# Patient Record
Sex: MALE | Race: WHITE | NOT HISPANIC OR LATINO | Employment: UNEMPLOYED | URBAN - METROPOLITAN AREA
[De-identification: names, ages, dates, MRNs, and addresses within clinical notes are randomized per-mention and may not be internally consistent; named-entity substitution may affect disease eponyms.]

---

## 2024-04-11 ENCOUNTER — TELEPHONE (OUTPATIENT)
Dept: BEHAVIORAL HEALTH | Facility: CLINIC | Age: 34
End: 2024-04-11

## 2024-04-11 ENCOUNTER — HOSPITAL ENCOUNTER (EMERGENCY)
Facility: CLINIC | Age: 34
Discharge: HOME | End: 2024-04-17
Attending: EMERGENCY MEDICINE | Admitting: EMERGENCY MEDICINE
Payer: COMMERCIAL

## 2024-04-11 DIAGNOSIS — F30.9 MANIA (H): ICD-10-CM

## 2024-04-11 DIAGNOSIS — F25.0 SCHIZOAFFECTIVE DISORDER, BIPOLAR TYPE (H): ICD-10-CM

## 2024-04-11 LAB
ANION GAP SERPL CALCULATED.3IONS-SCNC: 16 MMOL/L (ref 7–15)
BASOPHILS # BLD AUTO: 0.1 10E3/UL (ref 0–0.2)
BASOPHILS NFR BLD AUTO: 1 %
BUN SERPL-MCNC: 17.1 MG/DL (ref 6–20)
CALCIUM SERPL-MCNC: 9.6 MG/DL (ref 8.6–10)
CHLORIDE SERPL-SCNC: 102 MMOL/L (ref 98–107)
CREAT SERPL-MCNC: 1.03 MG/DL (ref 0.67–1.17)
DEPRECATED HCO3 PLAS-SCNC: 22 MMOL/L (ref 22–29)
EGFRCR SERPLBLD CKD-EPI 2021: >90 ML/MIN/1.73M2
EOSINOPHIL # BLD AUTO: 0.1 10E3/UL (ref 0–0.7)
EOSINOPHIL NFR BLD AUTO: 1 %
ERYTHROCYTE [DISTWIDTH] IN BLOOD BY AUTOMATED COUNT: 12.6 % (ref 10–15)
GLUCOSE SERPL-MCNC: 95 MG/DL (ref 70–99)
HCT VFR BLD AUTO: 46.9 % (ref 40–53)
HGB BLD-MCNC: 15.8 G/DL (ref 13.3–17.7)
IMM GRANULOCYTES # BLD: 0 10E3/UL
IMM GRANULOCYTES NFR BLD: 0 %
LYMPHOCYTES # BLD AUTO: 2.5 10E3/UL (ref 0.8–5.3)
LYMPHOCYTES NFR BLD AUTO: 22 %
MCH RBC QN AUTO: 29.9 PG (ref 26.5–33)
MCHC RBC AUTO-ENTMCNC: 33.7 G/DL (ref 31.5–36.5)
MCV RBC AUTO: 89 FL (ref 78–100)
MONOCYTES # BLD AUTO: 1 10E3/UL (ref 0–1.3)
MONOCYTES NFR BLD AUTO: 9 %
NEUTROPHILS # BLD AUTO: 7.6 10E3/UL (ref 1.6–8.3)
NEUTROPHILS NFR BLD AUTO: 67 %
NRBC # BLD AUTO: 0 10E3/UL
NRBC BLD AUTO-RTO: 0 /100
PLATELET # BLD AUTO: 354 10E3/UL (ref 150–450)
POTASSIUM SERPL-SCNC: 4.3 MMOL/L (ref 3.4–5.3)
RBC # BLD AUTO: 5.29 10E6/UL (ref 4.4–5.9)
SODIUM SERPL-SCNC: 140 MMOL/L (ref 135–145)
TSH SERPL DL<=0.005 MIU/L-ACNC: 1.93 UIU/ML (ref 0.3–4.2)
WBC # BLD AUTO: 11.3 10E3/UL (ref 4–11)

## 2024-04-11 PROCEDURE — 85025 COMPLETE CBC W/AUTO DIFF WBC: CPT | Performed by: EMERGENCY MEDICINE

## 2024-04-11 PROCEDURE — 84443 ASSAY THYROID STIM HORMONE: CPT | Performed by: EMERGENCY MEDICINE

## 2024-04-11 PROCEDURE — 250N000013 HC RX MED GY IP 250 OP 250 PS 637: Performed by: EMERGENCY MEDICINE

## 2024-04-11 PROCEDURE — 82374 ASSAY BLOOD CARBON DIOXIDE: CPT | Performed by: EMERGENCY MEDICINE

## 2024-04-11 PROCEDURE — 36415 COLL VENOUS BLD VENIPUNCTURE: CPT | Performed by: EMERGENCY MEDICINE

## 2024-04-11 PROCEDURE — 99285 EMERGENCY DEPT VISIT HI MDM: CPT

## 2024-04-11 RX ORDER — OLANZAPINE 10 MG/1
10 TABLET, ORALLY DISINTEGRATING ORAL 2 TIMES DAILY PRN
Status: DISCONTINUED | OUTPATIENT
Start: 2024-04-11 | End: 2024-04-17 | Stop reason: HOSPADM

## 2024-04-11 RX ORDER — QUETIAPINE 400 MG/1
400 TABLET, FILM COATED, EXTENDED RELEASE ORAL AT BEDTIME
Status: DISCONTINUED | OUTPATIENT
Start: 2024-04-11 | End: 2024-04-15

## 2024-04-11 RX ADMIN — QUETIAPINE FUMARATE 400 MG: 400 TABLET, EXTENDED RELEASE ORAL at 21:55

## 2024-04-11 RX ADMIN — OLANZAPINE 10 MG: 10 TABLET, ORALLY DISINTEGRATING ORAL at 14:54

## 2024-04-11 ASSESSMENT — ACTIVITIES OF DAILY LIVING (ADL)
ADLS_ACUITY_SCORE: 35

## 2024-04-11 ASSESSMENT — COLUMBIA-SUICIDE SEVERITY RATING SCALE - C-SSRS
TOTAL  NUMBER OF ABORTED OR SELF INTERRUPTED ATTEMPTS LIFETIME: NO
6. HAVE YOU EVER DONE ANYTHING, STARTED TO DO ANYTHING, OR PREPARED TO DO ANYTHING TO END YOUR LIFE?: NO
1. HAVE YOU WISHED YOU WERE DEAD OR WISHED YOU COULD GO TO SLEEP AND NOT WAKE UP?: NO
2. HAVE YOU ACTUALLY HAD ANY THOUGHTS OF KILLING YOURSELF?: NO
TOTAL  NUMBER OF INTERRUPTED ATTEMPTS LIFETIME: NO
ATTEMPT LIFETIME: NO

## 2024-04-11 NOTE — ED NOTES
Pt observed to be masturbating on camera. Pt told by security to stop this behavior. Pt stopped this action but still has penis out.
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
## 2024-04-11 NOTE — ED NOTES
Patient not cooperating with staff after we ask him to lay on the bed instead of kneel as that it is a safety hazard, patient was then asked again to stand up so that we can get the bed out and patient then rolled off the bed on the ground. Bed frame was taken out. Patient continues to lay on the ground. Patient was then found to be taking his pants down and smelling his fingers.

## 2024-04-11 NOTE — PROGRESS NOTES
IP MH Referral Acuity Rating Score (RARS)    LMHP complete at referral to IP MH, with DEC; and, daily while awaiting IP MH placement. Call score to PPS.  CRITERIA SCORING   New 72 HH and Involuntary for IP MH (not adolescent) 1/1   Boarding over 24 hours 0/1   Vulnerable adult at least 55+ with multiple co morbidities; or, Patient age 11 or under 0/1   Suicide ideation without relief of precipitating factors 0/1   Current plan for suicide 0/1   Current plan for homicide 0/1   Imminent risk or actual attempt to seriously harm another without relief of factors precipitating the attempt 0/1   Severe dysfunction in daily living (ex: complete neglect for self care, extreme disruption in vegetative function, extreme deterioration in social interactions) 1/1   Recent (last 2 weeks) or current physical aggression in the ED 1/1   Restraints or seclusion episode in ED 0/1   Verbal aggression, agitation, yelling, etc., while in the ED 0/1   Active psychosis with psychomotor agitation or catatonia 0/1   Need for constant or near constant redirection (from leaving, from others, etc).  1/1   Intrusive or disruptive behaviors 1/1   TOTAL Acuity Total Score: 5

## 2024-04-11 NOTE — ED NOTES
Pt observed to be sticking fingers in his anus and sniffing them. Pt reminded that he is on camera and was asked not to do this again.
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
## 2024-04-11 NOTE — ED NOTES
Pt is sleeping on the floor in his room with blanket around him . Letting pt sleep as he had been fairly disruptive before and Pt was given meds to help him calm down .

## 2024-04-11 NOTE — ED NOTES
Bed: [facility]  Expected date: 4/11/24  Expected time: 11:36 AM  Means of arrival:   Comments:  Hems 448 - 33M Psych Eval, Restrained, Hold eta 1333

## 2024-04-11 NOTE — ED NOTES
Patient was asked to confirm his birthday and patient noted that the birthday was incorrect, he stated that the correct birthday is [birth year].

## 2024-04-12 ENCOUNTER — DOCUMENTATION ONLY (OUTPATIENT)
Dept: OTHER | Facility: CLINIC | Age: 34
End: 2024-04-12
Payer: COMMERCIAL

## 2024-04-12 ENCOUNTER — TELEPHONE (OUTPATIENT)
Dept: BEHAVIORAL HEALTH | Facility: CLINIC | Age: 34
End: 2024-04-12
Payer: COMMERCIAL

## 2024-04-12 LAB
AMPHETAMINES UR QL SCN: NORMAL
BARBITURATES UR QL SCN: NORMAL
BENZODIAZ UR QL SCN: NORMAL
BZE UR QL SCN: NORMAL
CANNABINOIDS UR QL SCN: NORMAL
FENTANYL UR QL: NORMAL
OPIATES UR QL SCN: NORMAL
PCP QUAL URINE (ROCHE): NORMAL

## 2024-04-12 PROCEDURE — 99204 OFFICE O/P NEW MOD 45 MIN: CPT | Performed by: PSYCHIATRY & NEUROLOGY

## 2024-04-12 PROCEDURE — 80307 DRUG TEST PRSMV CHEM ANLYZR: CPT | Performed by: EMERGENCY MEDICINE

## 2024-04-12 PROCEDURE — 250N000013 HC RX MED GY IP 250 OP 250 PS 637: Performed by: EMERGENCY MEDICINE

## 2024-04-12 RX ORDER — QUETIAPINE 400 MG/1
400 TABLET, FILM COATED, EXTENDED RELEASE ORAL AT BEDTIME
COMMUNITY
End: 2024-04-17

## 2024-04-12 RX ADMIN — FLUOXETINE HYDROCHLORIDE 30 MG: 20 CAPSULE ORAL at 08:02

## 2024-04-12 RX ADMIN — OLANZAPINE 10 MG: 10 TABLET, ORALLY DISINTEGRATING ORAL at 08:02

## 2024-04-12 RX ADMIN — QUETIAPINE FUMARATE 400 MG: 400 TABLET, EXTENDED RELEASE ORAL at 22:13
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
## 2024-04-12 NOTE — TELEPHONE ENCOUNTER
[place]: [place]  Access Inpatient Bed Call Log  4/12/24 @ 1:00am   Intake has called facilities that have not updated their bed status within the last 12 hours.??     *METRO:  [place] -- [facility]: @ cap per website.  [facility]:  @ cap per website.  [place] -- [facility]: @ cap per website.  [place] -- [facility]: @ cap per website. Low acuity only.  [place] -- [facility]: @ cap per website.  [facility] -- [facility]: @ cap per website.   [facility] - @ cap per website. Ages 18-28, Voluntary only, NO aggression/physical/sexual assault, violence hx or drug abuse, or psychosis. Negative Covid.   [place] -- [facility]: @ cap per website.  [place] -- [facility]: @ cap per website.  [place] -- [facility]: POSTING 1 BED. Not reviewing until 10AM.     Pt remains on waitlist pending appropriate placement availability

## 2024-04-12 NOTE — PHARMACY-ADMISSION MEDICATION HISTORY
Pharmacist Admission Medication History    Admission medication history is complete. The information provided in this note is only as accurate as the sources available at the time of the update.    Information Source(s): Patient's pharmacy via phone    Changes made to PTA medication list:  Added: all  Deleted: None  Changed: None    Allergies reviewed with patient and updates made in EHR: unable to assess    Medication History Completed By: Kristin Uriarte RPH 4/12/2024 9:41 AM    PTA Med List   Medication Sig Last Dose    FLUoxetine (PROZAC) 20 MG capsule Take 20 mg by mouth daily     QUEtiapine ER (SEROQUEL XR) 400 MG 24 hr tablet Take 400 mg by mouth at bedtime

## 2024-04-12 NOTE — TELEPHONE ENCOUNTER
R: STATEWIDE, BUT PT'S PARENTS PREFER [facility] Access Inpatient Bed Call Log 4/12/24 9:15 AM    Intake has called facilities that have not updated the bed status within the last 12 hours.                                           [facility] is at capacity                       [facility] is posting 0 beds. [phone] Per call @8:15am              [facility] is posting 1 bed. Negative covid required                         [facility] is posting 0 beds. Neg covid. No high school/Livia-psych. [phone] Per call @9:14am              [facility] is [facility] 2 beds. [phone]              [facility] is posting 0 beds. [phone]              [facility] is posting 0 beds. Negative covid. [phone] Per call @8:45am, currently reviewing for last bed.              [facility] is [facility] 2 beds [phone]                   [facility] is posting 1 bed. LOW acuity ONLY. Mixed unit 12+. Negative covid- [phone]              [facility] has 2 beds posted. No aggression. Negative Covid. Low acuity              [facility] is posting 0 beds. Negative covid. [phone]              [facility] is posting 2 beds. Low acuity only. Neg covid.  [phone]               [facility] [place] is posting 4 beds. Low acuity. No current aggression.               [facility] is posting 0 beds available. Negative covid.  [phone].                  [facility] is posting 0 beds. Low acuity. 72 HH hold preferred. Negative covid required. [phone] Per call @8:55am              [facility] ([place]) is posting 1 bed. Low acuity only. Neg covid.  [phone]              [facility] [place] is posting 3 beds.  Negative covid required.   Vol only, No history of aggression, violence, or assault. No sexual offenders. No 72 HH holds. [phone]                    [facility] is posting 3 beds. Negative covid required.  (Must have the cognitive ability to do programming. No aggressive or violent behavior or recent HX in the last 2 yrs. MH must be primary.) Always low acuity. [phone]              [facility] has 0 beds posted. Negative covid required.  Low acuity only. Violence and aggression capped.  [phone]              [facility] is [facility] 0 beds. Low acuity, Negative covid required. [phone] Per call @8:51am, 5 currently on their list.              [facility] posting 2 beds Negative covid required.  [phone]               [facility] is posting 0 beds. Negative covid. LOW acuity. (No lines, drains, or tubes, oxygen, CPAP, IV, etc.) Must Have a Ride Home. [phone]              [facility] is posting 5 beds. Negative covid. (No. lines, drains, or tubes, oxygen, CPAP, IV, etc.). [phone]              [facility] is posting 10 beds. No covid test required. [phone] Per call @8:43am OOS      Pt remains on the work list pending appropriate bed availability.     10:20a Called Denis Dub[facility] to inquire about availability. [facility] informed at capacity.     10:26a Called [facility] to inquire about bed availability. [facility] informed will CB if there are beds available.     1:30p Paged Psychiatry Provider Royce, awaiting response.    2:27p Re-Paged Psychiatry Provider Royce, awaiting response.    3:07p Received call from Psychiatry Provider Royce for review of pt. Provider informed that pt is too acute to admit to Unit 20 and recommends unit 12 for admission d/t pt being manic and sexual intrusiveness.

## 2024-04-12 NOTE — ED NOTES
"At 1700, pt yelled out, \"Is anyone there!?\" After waking up from nap. RN went into pt's room and pt hit wall, stating he was frustrated about not being able to discharge home.    RN provided reassurance, explained plan of care, and offered orange juice. Pt thanked nurse. Pt is calm and redirectable at this time.    Pt is now talking with DEC .   "

## 2024-04-12 NOTE — ED NOTES
Pt refuses vital sign assessment. Does not like the sound of the machine. Ate breakfast and ceased yelling. Pt was provided with a board game at his request.

## 2024-04-12 NOTE — ED NOTES
Writer spoke with Pts mother, she endorsed that she would be emailing guardianship paperwork to writer.        HANNA Ugarte on 4/12/2024 at 7:52 AM    Writer received guardianship paperwork and writer forwarded this to honoring HANNA Zamudio on 4/12/2024 at 9:14 AM

## 2024-04-12 NOTE — ED NOTES
Pt yelling for someone to update him on his status. Pt then walking hallway and opening doors to other pt rooms. Pt redirectable to stop yelling and go back to his room. Writer offered board games, juice/food, toiletries, and change of clothes. Pt asking writer how he can get out of here. Writer encouraged pt to maintain appropriate behavior, take medications, and take responsibility for his self care and actions. Pt understanding and willing to take PRN zyprexa for yelling.     Pt requested coloring materials over a board game, then began yelling again in his room. When writer re-entered room, pt states he wants a board game instead. Writer informed pt that he will need to remain quiet x 30 min before he will be allowed a board game. Other pts are sleeping and he is creating an unsafe environment for them. Pt nodded in understanding.

## 2024-04-12 NOTE — ED NOTES
Pt slept through most of shift.  Intermittent, indiscernible yelling at times.  Pt requested orange juice and was given orange juice at bedside.

## 2024-04-12 NOTE — ED NOTES
Pt awake this morning and relates that the reason he is here is because he texted left wing/right wing friends that he went to school with to tell them he wants to be president.  Pt also states he has a good support system.

## 2024-04-12 NOTE — TELEPHONE ENCOUNTER
[place]: [place]  [place] Inpatient Bed Call Log 4/12/24 4:15 PM    Intake has called facilities that have not updated the bed status within the last 12 hours.                                       [facility] is at capacity                     [facility] is [facility] 0 beds. [phone] 4:15 PM Per Awa 0 beds available            [facility] is posting 2 beds. Negative covid required                       [facility] is posting 0 beds. Neg covid. No high school/Livia-psych. [phone] Per call @9:14am            [facility] is posting 2 beds. [phone]            [facility] is posting 0 beds. [phone]            [facility] is posting 0 beds. Negative covid. [phone] Per call @8:45am, currently reviewing for last bed.            [facility] is posting 2 beds [phone]               [facility] is posting 3 beds. LOW acuity ONLY. Mixed unit 12+. Negative covid- [phone]            [facility] has 2 beds posted. No aggression. Negative Covid. Low acuity            [facility] is posting 0 beds. Negative covid. [phone]            [facility] is posting 0 beds. Low acuity only. Neg covid.  [phone]             [facility] is posting 4 beds. Low acuity. No current aggression.             [facility] ([place] is posting 0 beds available. Negative covid.  [phone].                [facility] is posting 0 beds. Low acuity. 72 HH hold preferred. Negative covid required. [phone] Per call @8:55am            [facility] ([place]) is posting 4 beds. Low acuity only. Neg covid.  [phone]            [facility] [place] is posting 6 beds.  Negative covid required.   Vol only, No history of aggression, violence, or assault. No sexual offenders. No 72 HH holds. [phone]               [facility] is posting 3 beds. Negative covid  required.  (Must have the cognitive ability to do programming. No aggressive or violent behavior or recent HX in the last 2 yrs.  must be primary.) Always low acuity. [phone]            [facility] has 0 beds posted. Negative covid required.  Low acuity only. Violence and aggression capped.  [phone]            [facility] is posting 0 beds. Low acuity, Negative covid required. [phone] Per Teressa @4:22pm, currently have a fluctuating waitlist.            [facility] posting 5 beds Negative covid required.  [phone]             [facility] [place] is posting 0 beds. Negative covid. LOW acuity. (No lines, drains, or tubes, oxygen, CPAP, IV, etc.) Must Have a Ride Home. [phone]            [facility] is posting 5 beds. Negative covid. (No. lines, drains, or tubes, oxygen, CPAP, IV, etc.). [phone]            [facility] is posting 10 beds. No covid test required. [phone] Per call @8:43am     Pt remains on the work list pending appropriate bed availability.
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
## 2024-04-12 NOTE — ED NOTES
Pt literally just woke up and he was given his dinner .  Pt apparently does not recall having his DEC assessment . Pt wanted to know when he would have the assessment .

## 2024-04-13 ENCOUNTER — TELEPHONE (OUTPATIENT)
Dept: BEHAVIORAL HEALTH | Facility: CLINIC | Age: 34
End: 2024-04-13
Payer: COMMERCIAL

## 2024-04-13 PROCEDURE — 250N000013 HC RX MED GY IP 250 OP 250 PS 637: Performed by: EMERGENCY MEDICINE

## 2024-04-13 RX ADMIN — QUETIAPINE FUMARATE 400 MG: 400 TABLET, EXTENDED RELEASE ORAL at 22:00

## 2024-04-13 NOTE — TELEPHONE ENCOUNTER
[place]: [place]  [place] Inpatient Bed Call Log  4/13/24 @ 1:00am   Intake has called facilities that have not updated their bed status within the last 12 hours.??     *METRO:  [place] -- [facility]: @ cap per website.  [facility]:  @ cap per website.  [place] -- [facility]: POSTING 1 BED. Low acuity  [place] -- [facility]: @ cap per website. Low acuity only.  [place] -- [facility]: POSTING 1 BED.  [facility]: @ cap per website.   [facility]/ beds - @ cap per website. Ages 18-28, Voluntary only, NO aggression/physical/sexual assault, violence hx or drug abuse, or psychosis. Negative Covid.   [facility]: @ cap per website.  [place] -- [facility]: @ cap per website.  [place] -- [facility]: POSTING 2 BEDS. Not reviewing until 10AM.    *STATEWIDE (by distance):  [facility] - POSTING 4 BEDS. Mixed unit/Low acuity only.   [facility] - POSTING 2 BEDS. Low acuity, No aggression  [facility] -- @ cap per website.   [facility] - POSTING 4 BEDS. Low acuity only. No current aggression.  [facility] - POSTING 1 BED. Negative Covid. Lower acuity only.  [facility] - POSTING 2 BEDS. Low acuity only. Prefer med-adjustment placements.  [facility] - POSTING 3 BEDS.  No aggression.  [place] - [facility]: @ cap per website. No aggressive behaviors.   [place] -- : POSTING 5 BEDS.  No hx of aggression. No sexual offenders. Voluntary patients only.  [place] -- [facility]: POSTING 3 BEDS. Low acuity only. Must be able to do programming. No aggression/violent behavior in 2 years. No CD treatment.  [place] -- [place]: @ cap per website. Negative Covid test. Must be low acuity ONLY.  [place] -- [facility]: @ cap per website. Low acuity. Negative Covid.   [place] -- [facility]: POSTING 5 BEDS. Low Acuity only. Negative Covid.  [facility]: @ cap per website. No hx of aggression/assault. No lines, drains or tubes. Does not provide detox or CD treatment. Require a confirmed ride upon discharge.  [place] -- [facility]: POSTING 5 BEDS. Negative COVID. No medical devices.     Pt remains on waitlist pending appropriate placement availability

## 2024-04-13 NOTE — TELEPHONE ENCOUNTER
[place]: [place]  Access Inpatient Bed Call Log 4/13/24 8:30 AM    Intake has called facilities that have not updated the bed status within the last 12 hours.                             [facility] is at capacity           [facility] is posting 0 beds. [phone] Per call @8:29am, at capacity and can try back tomorrow  [facility] is posting 1 bed. Negative covid required             [facility] is posting 0 beds. Neg covid. No high school/Livia-psych. [phone] Per Cesia at 7:46am, they are at capacity for the weekend.  [facility] is posting 2 beds. [phone]  [facility] is posting 0 beds. [phone]   [facility] is posting 0 beds. Negative covid. [phone] Per call @8:10am  [facility] is posting 2 beds [phone]    [facility] is posting 4 beds. LOW acuity ONLY. Mixed unit 12+. Negative covid- [phone]  [facility] has 0 beds posted. No aggression. Negative Covid. Low acuity   [facility] is posting 0 beds. Negative covid. [phone] Per call @8:26, voicemail states they are at capacity  [facility] ([place] is posting 1 bed. Low acuity only. Neg covid.  [phone]   [facility] is posting 4 beds. Low acuity. No current aggression.    [facility] is posting 1 bed available. Negative covid.  [phone].      [facility] is posting 0 beds. Low acuity. 72 HH hold preferred. Negative covid required. [phone] Per call @8:23am, at capacity  [facility] is posting 3 beds. Low acuity only. Neg covid.  [phone]   [facility] [place] is posting 5 beds.  Negative covid required.   Vol only, No history of aggression, violence, or assault. No sexual offenders. No 72 HH holds. [phone]     [facility] is posting 3 beds. Negative covid required.  (Must have the cognitive ability to do programming. No aggressive or violent  behavior or recent HX in the last 2 yrs. MH must be primary.) Always low acuity. [phone]   [facility] has 0 beds posted. Negative covid required.  Low acuity only. Violence and aggression capped.  [phone]   [facility] is posting 1 bed. Low acuity, Negative covid required. [phone] Per call @8:19am, Potentially can review for 1M or 2 F depending on appropriateness  [place] [place] posting 0 beds Negative covid required.  [phone]   [facility] is posting 0 beds. Negative covid. LOW acuity. (No lines, drains, or tubes, oxygen, CPAP, IV, etc.) Must Have a Ride Home. [phone] Per call @8:17am, no answer  [facility] is posting 5 beds. Negative covid. (No. lines, drains, or tubes, oxygen, CPAP, IV, etc.). [phone]     Pt remains on the work list pending appropriate bed availability.

## 2024-04-13 NOTE — ED PROVIDER NOTES
Signout taken from Dr. Gutiérrez.  Patient came in with acute psychosis and hypersexual behaviors.  Patient is currently on a 72-hour hold and awaiting an inpatient mental health bed.     Regulo Cloud MD  04/13/24 5978

## 2024-04-14 ENCOUNTER — TELEPHONE (OUTPATIENT)
Dept: BEHAVIORAL HEALTH | Facility: CLINIC | Age: 34
End: 2024-04-14
Payer: COMMERCIAL

## 2024-04-14 PROCEDURE — 250N000013 HC RX MED GY IP 250 OP 250 PS 637: Performed by: EMERGENCY MEDICINE

## 2024-04-14 RX ADMIN — QUETIAPINE FUMARATE 400 MG: 400 TABLET, EXTENDED RELEASE ORAL at 21:26

## 2024-04-14 NOTE — PROGRESS NOTES
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 There are other situations that he is not feeling quite sure about. He believes a previous friend/crush was in disguise at his work and he is not quite sure what is reality. 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 He is also unsure if the spiritual connection and inner dialogue he has been having with another girl and her friend is real or delusional. He was open to consider that this was delusional per [facility]'s suggestion. Pt is confused about why he is having an episode when he has been keeping up on his medications. He identified several stressors, including signing a one year lease on his apartment, having coffee and coming out as bisexual. He reported that he has been told by the psychiatric provider that his selective serotonin reuptake inhibitor could have caused this episode. Pt continues to be agreeable to IP MH, but states that he would prefer to just stay in this room as he doesn't like attending groups etc in the hospital.    Therapeutic Intervention(s) Provided: Engaged in safety planning, Worked on relapse prevention planning (review of stressors, early warning signs, written plan to respond to signs, and rehearse plan)., Explored and identified early warning signs to psychosis. Explored motivation for IP MH., Explored barriers to discharge.    Current Symptoms:   " impaired decision making, (delusions)      Mental Status Exam   Affect: Appropriate  Appearance: Appropriate  Attention Span/Concentration: Attentive  Eye Contact: Variable    Fund of Knowledge: Appropriate   Language /Speech Content: Fluent  Language /Speech Volume: Normal  Language /Speech Rate/Productions: Normal  Recent Memory: Intact  Remote Memory: Intact  Mood: Normal  Orientation to Person: Yes   Orientation to Place: Yes  Orientation to Time of Day: Yes  Orientation to Date: Yes     Situation (Do they understand why they are here?): Yes  Psychomotor Behavior: Normal  Thought Content: Delusions  Thought Form: Intact    Treatment Objective(s) Addressed: rapport building, orienting the patient to therapy, identifying and practicing coping strategies, identifying an appropriate aftercare plan, processing feelings, safety planning, assessing safety, identifying treatment goals    Patient Response to Interventions: eager to participate    Progress Towards Goals: Patient Reports Symptoms Are: improving  Patient Progress Toward Goals: is making progress  Comment: Pt's delusional thinking is decreasing and his insight in improving.  Next Step to Work Toward Discharge: symptom stabilization  Symptom Stabilization Comment: Pt is awaiting IP MH placement.    Case Management: Summary of Interaction: None today.    Plan: inpatient mental health  yes provider Dr. Cloud  yes    Clinical Substantiation: Pt continues to report delusional thinking, but his symptoms of psychosis appear to be decreasing and his insight increasing. His mood appears dysthymic and he did not engage in odd behaviors during assessment. Pt will benefit from inpatient hospitalization to further stabilize current acute symptoms of psychosis.    Legal Status: Legal Status at Admission: 72 Hour Hold  72 Hour Hold - Date/Time Initiated: 4/11/24 @1554  72 Hour Hold - Date/Time Ends: 4/16/24 @ 1554    Session Status: Time session started: 2128  Time  session ended: 2148  Session Duration (minutes): 20 minutes  Session Number: 2  Anticipated number of sessions or this episode of care: 4    Time Spent: 20 minutes    CPT Code: CPT Codes: 05351 - Psychotherapy (with patient) - 30 (16-37*) min    Diagnosis:   Patient Active Problem List   Diagnosis    Schizoaffective disorder, bipolar type (H)       Primary Problem This Admission: Active Hospital Problems    Schizoaffective disorder, bipolar type (H)        Chantal Sultana   Licensed Mental Health Professional (LMHP), [facility]  [phone]

## 2024-04-14 NOTE — TELEPHONE ENCOUNTER
R: [place]  Access Inpatient Bed Call Log  4/14/24 @ 1:00am   Intake has called facilities that have not updated their bed status within the last 12 hours.??     *METRO:  [place] -- [facility]: @ cap per website.  [facility]:  @ cap per website.  [place] -- [facility]: @ cap per website. Low acuity  [place] -- [facility]: @ cap per website. Low acuity only.  [place] -- [facility]: @ cap per website.  [facility]: @ cap per website.   [facility]/ beds - @ cap per website. Ages 18-28, Voluntary only, NO aggression/physical/sexual assault, violence hx or drug abuse, or psychosis. Negative Covid.   [place] [facility]: @ cap per website.  [place] -- [facility]: @ cap per website.  [place] -- [facility]: POSTING 2 BEDS. Not reviewing until 10AM.    *STATEWIDE (by distance):  [facility] - POSTING 4 BEDS. Mixed unit/Low acuity only.   [facility] - @ cap per website. Low acuity, No aggression  [facility] -- @ cap per website.   [facility] - POSTING 4 BEDS. Low acuity only. No current aggression.  [facility] - POSTING 1 BED. Negative Covid. Lower acuity only.  [facility] - @ cap per website. Low acuity only. Prefer med-adjustment placements.  [facility] - POSTING 3 BEDS.  No aggression.  [place] - [facility]: @ cap per website. No aggressive behaviors.   [place] -- [facility]: POSTING 5 BEDS.  No hx of aggression. No sexual offenders. Voluntary patients only.  [place] -- [facility]: POSTING 2 BEDS. Low acuity only. Must be able to do programming. No aggression/violent behavior in 2 years. No CD treatment.  [place] [facility]: @ cap per website. Negative Covid test. Must be low acuity ONLY.  [place] -- [facility]: @ cap per website. Low acuity. Negative Covid.   [place] -- [facility]: @ cap per website. Low Acuity only. Negative Covid.  [facility]  [facility]: @ [facility] per website. No hx of aggression/assault. No lines, drains or tubes. Does not provide detox or CD treatment. Require a confirmed ride upon discharge.  [place] -- [facility]: POSTING 5 BEDS. Negative COVID. No medical devices.     Pt remains on waitlist pending appropriate placement availability

## 2024-04-14 NOTE — ED PROVIDER NOTES
Received sign out.  DEC did re-assessment and notes improvement, still having some psychosis but much improved.  Psychiatry consult will be placed and possibly might go home tomorrow if continues to improve and approved by psychiatry.  Also, patient is guardian of parents who approve of care, does not require 72 hold.     Song Gutiérrez MD  04/14/24 155       Song Gutiérrez MD  04/14/24 1553

## 2024-04-14 NOTE — TELEPHONE ENCOUNTER
R: [place]  Access Inpatient Bed Call Log 4/14/24 3:05 PM    Intake has called facilities that have not updated the bed status within the last 12 hours.                                       [facility] is at capacity                     [facility] is posting 0 beds. [phone] Per call at 3:11 PM, per [place] they are at capacity            [facility] is posting 0 beds. Negative covid required                       [facility] is posting 0 beds. Neg covid. No high school/Livia-psych. [phone] Per Cesia yesterday, they are at capacity for the weekend.            [facility] is posting 0 beds. [phone]            [facility] is posting 0 beds. [phone]            [facility] is posting 0 beds. Negative covid. [phone] Per call @8:05am            [facility] is posting 2 beds [phone]               [facility] is posting 4 beds. LOW acuity ONLY. Mixed unit 12+. Negative covid- [phone]            [facility] has 0 beds posted. No aggression. Negative Covid. Low acuity            [facility] is posting 0 beds. Negative covid. [phone] Per vm @8:21, they are at cap            [facility] is posting 1 bed. Low acuity only. Neg covid.  [phone]             [facility] is posting 4 beds. Low acuity. No current aggression.             [facility] ([place]) is posting 0 beds available. Negative covid.  [phone].                [facility] [place] is posting 0 beds. Low acuity. 72 HH hold preferred. Negative covid required. [phone] Per call @8:19am            [facility] ([place] is posting 3 beds. Low acuity only. Neg covid.  [phone]            [facility] [place] is posting 3 beds.  Negative covid required.   Vol only, No history of aggression, violence, or assault. No sexual offenders. No 72 HH holds. [phone] 3:13 PM Per [place] they have  3 open adult beds, must be voluntary.               [facility] is posting 2 beds. Negative covid required.  (Must have the cognitive ability to do programming. No aggressive or violent behavior or recent HX in the last 2 yrs. MH must be primary.) Always low acuity. [phone] Per call @8:18am            [facility] has 0 beds posted. Negative covid required.  Low acuity only. Violence and aggression capped.  [phone]            [facility] is posting 0 beds. Low acuity, Negative covid required. [phone] Per call @8:15, at cap w/ wait list            [facility] posting 0 beds Negative covid required.  [phone]             [facility] [place] is posting 0 beds. Negative covid. LOW acuity. (No lines, drains, or tubes, oxygen, CPAP, IV, etc.) Must Have a Ride Home. [phone] Per call @3:17 Per [place] they are currently at capacity.            [facility] is posting 5 beds. Negative covid. (No. lines, drains, or tubes, oxygen, CPAP, IV, etc.). [phone] Per call @8:10am beds available    Pt remains on the work list pending appropriate bed availability.     6:57 PM Intake called [facility]. Per Maggie, there are no high acuity beds available to review this pt for.

## 2024-04-14 NOTE — TELEPHONE ENCOUNTER
R: [place]  Access Inpatient Bed Call Log 4/14/24 8:30 AM    Intake has called facilities that have not updated the bed status within the last 12 hours.                             [facility] is at capacity           [facility] is posting 0 beds. [phone] Per call at 8:25am, not taking outside referrals. Can try after bed meeting  [facility] is posting 1 bed. Negative covid required             [facility] is posting 0 beds. Neg covid. No high school/Livia-psych. [phone] Per Cesia yesterday, they are at capacity for the weekend.  [facility] is posting 2 beds. [phone]  [facility] is posting 0 beds. [phone]   [facility] is posting 0 beds. Negative covid. [phone] Per call @8:05am  [facility] is posting 2 beds [phone]    [facility] is posting 4 beds. LOW acuity ONLY. Mixed unit 12+. Negative covid- [phone]  [facility] has 0 beds posted. No aggression. Negative Covid. Low acuity   [facility] is posting 0 beds. Negative covid. [phone] Per vm @8:21, they are at cap  [facility] ([place] is posting 1 bed. Low acuity only. Neg covid.  [phone]   [facility] is posting 4 beds. Low acuity. No current aggression.    [facility] ([place] is posting 1 bed available. Negative covid.  [phone].      [facility] is posting 0 beds. Low acuity. 72 HH hold preferred. Negative covid required. [phone] Per call @8:19am  [facility] ([place] is posting 3 beds. Low acuity only. Neg covid.  [phone]   [facility] [place] is posting 5 beds.  Negative covid required.   Vol only, No history of aggression, violence, or assault. No sexual offenders. No 72 HH holds. [phone]     [facility] is posting 2 beds. Negative covid required.  (Must have the cognitive ability to do programming. No aggressive or violent behavior or recent  HX in the last 2 yrs. MH must be primary.) Always low acuity. [phone] Per call @8:18am   has 0 beds posted. Negative covid required.  Low acuity only. Violence and aggression capped.  [phone]   [facility] is posting 0 beds. Low acuity, Negative covid required. [phone] Per call @15, at cap w/ wait list  [facility] posting 1 bed Negative covid required.  [phone]   [facility] is posting 1 bed. Negative covid. LOW acuity. (No lines, drains, or tubes, oxygen, CPAP, IV, etc.) Must Have a Ride Home. [phone] Per call @8:14am  [facility] is posting 5 beds. Negative covid. (No. lines, drains, or tubes, oxygen, CPAP, IV, etc.). [phone] Per call @8:10am beds available    Pt remains on the work list pending appropriate bed availability.

## 2024-04-14 NOTE — PROGRESS NOTES
"Triage & Transition Services, Extended Care     Therapy Progress Note    Patient: Cachorro goes by \"Cachorro,\" uses he/him pronouns  Date of Service: April 14, 2024  Site of Service: [facility] EMERGENCY DEPT                             BH2  Patient was seen yes  Mode of Assessment: In person    Presentation Summary: Pt reported that he feels he is clear minded today and is feeling bored. Pt continues to display insight re delusions he had a few days ago, but also continues to endorse some delusions such as an old friend showing in disguise at work, or a girl playing \"games with me\", suggesting that she wanted him to look in her phone and purse while \"she pretended to sleep\". Pt is open to consider that these may also be delusions. Pt's mood is euthymic in session and is agreeable to remain in the hospital. He states preference for staying in the ED rather than IP as he doesn't like to do groups in the hospital. Pt reported optimism that he has been open and forthcoming about some issues in his life and that he he is excited for the new him and new opportunities. He identified goals of dating, working part time and spending time with friends. We explored current outpatient providers and pt voiced understanding regarding the need for regula and ongoing appointments.    Therapeutic Intervention(s) Provided: Engaged in safety planning, Explored motivation for IP MH. Explored barriers to discharge.    Current Symptoms:   impaired decision making, irritable    (delusions)      Mental Status Exam   Affect: Appropriate  Appearance: Appropriate  Attention Span/Concentration: Attentive  Eye Contact: Engaged    Fund of Knowledge: Appropriate   Language /Speech Content: Fluent  Language /Speech Volume: Normal  Language /Speech Rate/Productions: Normal  Recent Memory: Intact  Remote Memory: Intact  Mood: Normal  Orientation to Person: Yes   Orientation to Place: Yes  Orientation to Time of Day: Yes  Orientation to " Date: Yes     Situation (Do they understand why they are here?): Yes  Psychomotor Behavior: Normal  Thought Content: Delusions  Thought Form: Intact    Treatment Objective(s) Addressed: identifying and practicing coping strategies, processing feelings, safety planning, identifying an appropriate aftercare plan, assessing safety, identifying treatment goals, identifying additional supports    Patient Response to Interventions: eager to participate    Progress Towards Goals: Patient Reports Symptoms Are: improving  Patient Progress Toward Goals: is making progress  Comment: Pt's delusional thinking is decreasing and his insight in improving.  Next Step to Work Toward Discharge: symptom stabilization  Symptom Stabilization Comment: Pt is awaiting IP MH placement. Pt will have a psych consult tomorrow where possible d/c can be explored due to curet level of stablization.    Case Management: Case Management Included: collaborating with patient's support system  Details on Collaborating with Patient's Support System: [facility] spoke with pt's parents and legal guardians. HP updated them on pt's progress. Pt's mood has stabilized, his delusion are decreased but still present.  Summary of Interaction: The family is in support of whatever is recommended. They are in support of current IP and indicated that they are also in support of discharge if this will be recommended, they do want to be consulted first.    Plan: inpatient mental health  yes provider Dr. Gutiérrez  yes    Clinical Substantiation: Pt's mood has stabilized and his psychosis has cleared significantly. Pt continues to present with delusional thinking. Pt should be assessed during psych tomorrow to determine whether the IP MH recommendation is still appropriate or if pt may be appropriate for discharge.    Legal Status: Legal Status at Admission:  (Pt is on a 72 hour. Pt does have legal guardians that have supported IP and a 72 hour is no longer needed for this  reason. This has been brought to the attention of the attending. Honoring choices have validated edwardohip.)  72 Hour Hold - Date/Time Initiated: 4/11/24 @1554  72 Hour Hold - Date/Time Ends: 4/16/24 @ 1554    Session Status: Time session started: 1404  Time session ended: 1429  Session Duration (minutes): 25 minutes  Session Number: 3  Anticipated number of sessions or this episode of care: 4    Time Spent: 25 minutes    CPT Code: CPT Codes: 86806 - Psychotherapy (with patient) - 30 (16-37*) min    Diagnosis:   Patient Active Problem List   Diagnosis    Schizoaffective disorder, bipolar type (H)       Primary Problem This Admission: Active Hospital Problems    Schizoaffective disorder, bipolar type (H)        Chantal Sultana   Licensed Mental Health Professional (LMHP), [facility]  [phone]

## 2024-04-14 NOTE — ED NOTES
" Pt  did not engage in odd behaviors this shift. He appropriately took his  scheduled night time medications and as asked appropriate questions such as\" What dose of the Seroquel are you giving me and what time  do we get breakfast\"? Pt informed Seroquel 400 mg and that breakfast  arrived around 08:00. Pt replied \" Thank you\".       "

## 2024-04-14 NOTE — ED PROVIDER NOTES
ED course:  Patient received as a handoff from my partner Dr. Mendez.  No issues during my care.  Remains on 72-hour hold.    Impression:    ICD-10-CM    1. Psychosis, unspecified psychosis type (H)  F29       2. Delusions (H)  F22       3. Hypersexuality state  F52.8       4. Alicia (H)  F30.9         Disposition:  Signed out to my partner Dr. Gutiérrez pending mental health bed availability       Gennaro Gonzalez,   04/14/24 2305

## 2024-04-14 NOTE — TELEPHONE ENCOUNTER
R: [place]  Access Inpatient Bed Call Log 4/13/24 3:30 PM    Intake has called facilities that have not updated the bed status within the last 12 hours.                                       [facility] is at capacity                     [facility] is posting 0 beds. [phone] Per call @8:29am, at capacity and can try back tomorrow            [facility] is posting 1 bed. Negative covid required                       [facility] is posting 0 beds. Neg covid. No high school/Livia-psych. [phone] Per Cesia at 7:46am, they are at capacity for the weekend.            [facility] is posting 0 beds. [phone]            [facility] is posting 0 beds. [phone]            [facility] is posting 0 beds. Negative covid. [phone] Per call @8:10am            [facility] is posting 2 beds [phone]               [facility] is posting 0 beds. LOW acuity ONLY. Mixed unit 12+. Negative covid- [phone] 3:40 PM Per  RN currently no open beds.            [facility] has 0 beds posted. No aggression. Negative Covid. Low acuity            [facility] is posting 0 beds. Negative covid. [phone] Per call @3:51 PM, voicemail states they are at capacity.            [facility] is posting 1 bed. Low acuity only. Neg covid.  [phone]             [facility] is posting 4 beds. Low acuity. No current aggression.             [facility] is posting 2 beds available. Negative covid.  [phone].                [facility] is posting 0 beds. Low acuity. 72 HH hold preferred. Negative covid required. [phone] Per call @8:23am, at capacity            [facility] is posting 3 beds. Low acuity only. Neg covid.  [phone]            [facility] [place] is posting 5 beds.  Negative covid required.   Vol only, No history of aggression, violence, or  assault. No sexual offenders. No 72  holds. [phone]               [facility] is posting 2 beds. Negative covid required.  (Must have the cognitive ability to do programming. No aggressive or violent behavior or recent HX in the last 2 yrs. MH must be primary.) Always low acuity. [phone]            [facility] has 0 beds posted. Negative covid required.  Low acuity only. Violence and aggression capped.  [phone]            [facility] is posting 1 bed. Low acuity, Negative covid required. [phone] Per call @3:52 PM Per Kait they are at capacity            [facility] posting 0 beds Negative covid required.  [phone]             [facility] is posting 0 beds. Negative covid. LOW acuity. (No lines, drains, or tubes, oxygen, CPAP, IV, etc.) Must Have a Ride Home. [phone] Per call @3:59, [place] they have no open beds.            [facility] is posting 7 beds. Negative covid. (No. lines, drains, or tubes, oxygen, CPAP, IV, etc.). [phone] 3:56 PM Per Janine they have 4 general unit beds and 3 high acuity beds open    Pt remains on the work list pending appropriate bed availability.

## 2024-04-15 ENCOUNTER — TELEPHONE (OUTPATIENT)
Dept: BEHAVIORAL HEALTH | Facility: CLINIC | Age: 34
End: 2024-04-15
Payer: COMMERCIAL

## 2024-04-15 PROCEDURE — 250N000013 HC RX MED GY IP 250 OP 250 PS 637: Performed by: PSYCHIATRY & NEUROLOGY

## 2024-04-15 PROCEDURE — 99223 1ST HOSP IP/OBS HIGH 75: CPT | Performed by: PSYCHIATRY & NEUROLOGY

## 2024-04-15 RX ADMIN — QUETIAPINE FUMARATE 450 MG: 200 TABLET ORAL at 20:26

## 2024-04-15 NOTE — ED NOTES
"Pt is wondering what the plan is for the day.  Pt states that he \"feels back to baseline.  I realize the delusions that I was having on Friday and Saturday weren't real.\"  \"I'm just wondering if I will be having that psych assessment today and if that is when it's decided whether I can discharge or go inpatient.  You know, inpatient is not something that I would like to do.  Those places are not great.\"  "

## 2024-04-15 NOTE — TELEPHONE ENCOUNTER
R: [facility] Inpatient Bed Call Log  4/15/24 @ 1:00am   DUE TO A WEBSITE ISSUE, [facility] has called facilities to ask about bed availability.     *METRO:  -[place] -- [facility]: @ capacity.  -[facility]:  @ cap per coordinator.  -[place] -- [facility]: @ cap per coordinator. Low acuity  -[place]hs[place] -- [facility]: @ cap per coordinator. Low acuity only.  -[place] -- [facility]: @ cap per coordinator.  -[facility]: @ cap per coordinator.  -[facility] - Per call, 1 BED AVAILABLE. Ages 18-28, Voluntary only, NO aggression/physical/sexual assault, violence hx or drug abuse, or psychosis. Negative Covid.  -[place] -- [facility]: @ cap per coordinator.  -[place] -- [facility]: No answer.  -[place] -- [facility]: @ cap per coordinator. Do not review overnights.     *STATEWIDE (by distance):  -[facility]: @ cap per coordinator and backed up on reviews. Mixed unit/Low acuity only.   -[facility] - @ cap per coordinator. Low acuity, No aggression  -[facility] -- @ cap per coordinator.  -[facility] - @ cap per coordinator. Low acuity only. No current aggression.  -[facility] - @ cap per coordinator. Negative Covid. Lower acuity only.  -[facility] - @ cap per coordinator. Low acuity only. Prefer med-adjustment placements.  -[place] [facility] - Per call, 1 BED AVAILABLE.  No aggression.  -[place] - [facility]: @ cap per coordinator. No aggressive behaviors. Do not review overnights.  -[place] -- [facility]: Beds Available, but already reviewing for them.  No hx of aggression. No sexual offenders. Voluntary patients only.  -[place] -- [facility]: Per call, 3 BEDS AVAILABLE. Low acuity only. Must be able to do programming. No aggression/violent behavior in 2 years. No CD treatment.  -[place] -- [facility]: @ cap per coordinator. Negative Covid  test. Must be low acuity ONLY.  [place] -- [facility]: @ cap per coordinator. Low acuity. Negative Covid.   -[place] -- [facility]: @ cap per coordinator. Low Acuity only. Negative Covid.  -[place] - [facility]: @ cap per coordinator. No hx of aggression/assault. No lines, drains or tubes. Does not provide detox or CD treatment. Require a confirmed ride upon discharge.  -[place] -- [facility]: Per call, 3 BEDS AVAILABLE. Negative COVID. No medical devices.     Pt remains on waitlist pending appropriate placement availability

## 2024-04-15 NOTE — TELEPHONE ENCOUNTER
R: [place]  [facility] Inpatient Bed Call Log 4/15/24 @3:30 PM    Intake has called facilities that have not updated the bed status within the last 12 hours.                               [facility] is at capacity            [facility] is at capacity. [phone] Per call to [facility] at 9:50am, call after 3:30pm.    [facility] is at capacity. [facility] [phone] Negative covid required.  Per call to [facility] at 9:57 am, at capacity in [facility] until tomorrow at 8:30 am.        [facility] is at capacity. Neg covid. No high school/Livia-psych. [phone] Per call to [facility] at 9:59 am, administration has capped unit at 18.   [facility] is at capacity. [phone] [facility] Per call to [facility] at 9:57 am, at capacity in [facility] until tomorrow at 8:30 am.   [facility] is at capacity. [phone] Per call to Martinez at 10:03 am, all [facility] sites at [facility].   [facility] is posting 3 Young Adult beds. Negative covid. [phone] Per call to Carlee @ 10:05 AM, only a handful of beds available.   [facility] is at capacity. [phone]     [facility] is posting 4 beds. LOW acuity ONLY. Mixed unit 12+. Negative covid- [phone] Per call to Martinez at 10:03 am, all [facility] sites at cap. Pt not appropriate for a mixed unit  [facility] has 1 beds posted. No aggression. Negative Covid. Low acuity Per call to [facility] at 9:57 am, at capacity in [facility] until tomorrow at 8:30 am.        [facility] is posting 0 beds. Negative covid. [phone] Per @10:13 am, VM is from 4/12, left message.   [facility] is posting 1 bed. Low acuity only. Neg covid.  [phone]    [facility] is posting 2 beds. Low acuity. No current aggression.     [facility] is posting 0 beds available. Negative covid.  [phone].   Per call to Cesilia @10:18am, at [facility].   [facility] is posting 0 beds. Low  acuity. 72 HH hold preferred. Negative covid required. [phone]    [facility] is posting 4 beds. Low acuity only. Neg covid.  [phone] per call to Cesilia @10:18 am, they are reviewing for 1 of these beds.   [facility] in [place] is posting 0 beds.  Negative covid required.   Vol only, No history of aggression, violence, or assault. No sexual offenders. No 72 HH holds. [phone] Per call to Sunshine @ 10:20 am, at capacity, but call back this evening.   [facility] is posting 4 beds. Negative covid required.  (Must have the cognitive ability to do programming. No aggressive or violent behavior or recent HX in the last 2 yrs. MH must be primary.) Always low acuity. [phone] Per call to Silver @10:22am, there are indeed 5 beds available.   [facility] has 0 beds posted. Negative covid required.  Low acuity only. Violence and aggression capped.  [phone] Per call to Aleyda @10:25 am, they are at capacity.   [facility] is posting 0 beds. Low acuity, Negative covid required. [phone] Per call to Kait @10:26 am, at capacity with 6 on the waitlist.   [facility] posting  1 beds. Negative covid required.  [phone] Per call to Mat @ 10:30 am, at capacity.   [facility] [place] is posting 0 beds. Negative covid. LOW acuity. (No lines, drains, or tubes, oxygen, CPAP, IV, etc.) Must Have a Ride Home. [phone] Per call to Jessica @10:31 am, they are full.   [facility] is posting 4 beds. Negative covid. (No. lines, drains, or tubes, oxygen, CPAP, IV, etc.). [phone] Per call to Janine @10:32am 3 higher acuity beds and 2 lower acuities available; Has 4 to review.       Pt remains on the work list pending appropriate bed availability.        2:04 PM Called [facility] for an update on pt as charting indicates that pt feels he is at baseline and would like to discharge. RN unavailable.  Requested call back.   2:39 PM Alfonzo GATES called PPS, as of now, still seeking [facility]   3:01 PM Paged Ember to review for admission to St.   3:11 PM Ember is reviewing one pt ahead of [place].   4:33 PM Ember declined pt for admission to St.  d/t pt and unit acuity.   4:50 PM Paged Reinier to review decline.   4:56 PM Paged Ivonne to review decline.   4:58 PM Ivonne called and supports declined to St. 10.

## 2024-04-15 NOTE — ED PROVIDER NOTES
Assumed care of patient at approximately 0 700, briefly this is a 33-year-old male with a history of psychosis, hypersexual behaviors who presented with psychosis has been gradually improving over the past three days. Pending DEC and psychiatry reassessment.    13:52   I spoke with Ayla from DEC.  He is improving however at this time still would benefit from observation in the ED. She recommends that patient remain here in the emergency department with reassessment tomorrow and hopefully have family as part of the conversation for discharge planning.     Patient's care signed out to oncoming physician pending repeat assessment in the AM tomorrow.     MD Arun Ugarte Connie, MD  04/15/24 [id number]

## 2024-04-15 NOTE — TELEPHONE ENCOUNTER
R: [facility] Inpatient Bed Call Log  4/15/24 @ 1:00am   DUE TO A WEBSITE ISSUE, [facility] has called facilities to ask about bed availability.     *METRO:  [place] -- [facility]: @ capacity.  [facility]:  @ cap per coordinator.   [place] -- [facility]: @ cap per coordinator. Low acuity  [place] -- [facility]: @ cap per coordinator. Low acuity only.  [place] -- [facility]: @ cap per coordinator.  [facility]: @ cap per coordinator.   [facility]/ beds - Per call, 1 BED AVAILABLE. Ages 18-28, Voluntary only, NO aggression/physical/sexual assault, violence hx or drug abuse, or psychosis. Negative Covid.   [place] -- [facility]: @ cap per coordinator.  [place] -- [facility]: No answer.   [place] -- [facility]: @ cap per coordinator. Do not review overnight.    *STATEWIDE (by distance):  [facility]: @ cap per coordinator and backed up on reviews. Mixed unit/Low acuity only.   [facility] - @ cap per coordinator. Low acuity, No aggression  [facility] -- @ cap per coordinator.   [facility] - @ cap per coordinator. Low acuity only. No current aggression.  [facility] - @ cap per coordinator. Negative Covid. Lower acuity only.  [facility] - @ cap per coordinator. Low acuity only. Prefer med-adjustment placements.  [place] [facility] - Per call, 1 BED AVAILABLE.  No aggression.  [place] - [facility]: @ cap per coordinator. No aggressive behaviors. Do not review overnight.  [place] -- [facility]: Beds Available, but already reviewing for them.  No hx of aggression. No sexual offenders. Voluntary patients only.  [place] -- [facility]: Per call, 3 BEDS AVAILABLE. Low acuity only. Must be able to do programming. No aggression/violent behavior in 2 years. No CD treatment.  [place] -- [facility]: @ cap per coordinator. Negative Covid test. Must be low  acuity ONLY.  [place] -- [facility]: @ cap per coordinator. Low acuity. Negative Covid.   [place] -- [facility]: @ cap per coordinator. Low Acuity only. Negative Covid.  [facility]: @ cap per coordinator. No hx of aggression/assault. No lines, drains or tubes. Does not provide detox or CD treatment. Require a confirmed ride upon discharge.  [place] -- [facility]: Per call, 3 BEDS AVAILABLE. Negative COVID. No medical devices.     Pt remains on waitlist pending appropriate placement availability

## 2024-04-15 NOTE — ED NOTES
Pt requested an update on when he was going to discharge. Pt was informed of the plan for a psych consult in the morning to determine whether or not he is ready for discharge. Pt was calm and agreeable during conversation and took the news very well. Pt requested lights to be turned off and is now resting quietly in bed.
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
## 2024-04-16 ENCOUNTER — TELEPHONE (OUTPATIENT)
Dept: BEHAVIORAL HEALTH | Facility: CLINIC | Age: 34
End: 2024-04-16
Payer: COMMERCIAL

## 2024-04-16 PROCEDURE — 250N000013 HC RX MED GY IP 250 OP 250 PS 637: Performed by: PSYCHIATRY & NEUROLOGY

## 2024-04-16 PROCEDURE — 99233 SBSQ HOSP IP/OBS HIGH 50: CPT | Performed by: PSYCHIATRY & NEUROLOGY

## 2024-04-16 RX ADMIN — QUETIAPINE FUMARATE 450 MG: 200 TABLET ORAL at 21:21

## 2024-04-16 RX ADMIN — QUETIAPINE FUMARATE 450 MG: 200 TABLET ORAL at 08:30
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
## 2024-04-16 NOTE — ED NOTES
[facility]  ED to [facility] Checklist:      Goal for [facility]: Medication management    Current Behavior: Cooperative    Safety Concerns: Delusions    Legal Hold Status: Guardian hold    Medically Cleared by ED provider: Yes    Patient Therapeutically Searched: Therapeutic search by ED staff (strings, belts, shoes, pockets, electronics, etc.)    Belongings: In room locker    Independent Ambulation at Baseline: Yes/No: Yes    Participates in Care/Conversation: Yes/No: Yes    Patient Informed about E[facility]: Yes/No: Yes    DEC: Ordered and completed    Patient Ready to be Transferred to [facility]? Yes/No: Yes

## 2024-04-16 NOTE — ED PROVIDER NOTES
I assumed care of the patient from Dr. Dias, the patient continues to convalesce in the emergency department for over 3 days now, he has gradually been improving with medications.  Plan is for repeat mental health assessment tomorrow morning to determine final disposition.  Care was turned over to Dr. Vigil.  There were no acute events throughout the duration of my shift.     Trigger, Bob Cuba MD  04/15/24 6436
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
## 2024-04-17 VITALS
BODY MASS INDEX: 30.9 KG/M2 | RESPIRATION RATE: 16 BRPM | HEIGHT: 69 IN | OXYGEN SATURATION: 97 % | TEMPERATURE: 98.3 F | DIASTOLIC BLOOD PRESSURE: 78 MMHG | WEIGHT: 208.6 LBS | SYSTOLIC BLOOD PRESSURE: 120 MMHG | HEART RATE: 92 BPM

## 2024-04-17 PROCEDURE — 99214 OFFICE O/P EST MOD 30 MIN: CPT | Performed by: PSYCHIATRY & NEUROLOGY

## 2024-04-17 RX ORDER — QUETIAPINE 400 MG/1
400 TABLET, FILM COATED, EXTENDED RELEASE ORAL AT BEDTIME
COMMUNITY
Start: 2024-04-17

## 2024-04-17 RX ORDER — QUETIAPINE FUMARATE 50 MG/1
50 TABLET, EXTENDED RELEASE ORAL AT BEDTIME
Qty: 30 TABLET | Refills: 0 | Status: SHIPPED | OUTPATIENT
Start: 2024-04-17

## 2024-04-17 ASSESSMENT — COLUMBIA-SUICIDE SEVERITY RATING SCALE - C-SSRS
ATTEMPT SINCE LAST CONTACT: NO
1. SINCE LAST CONTACT, HAVE YOU WISHED YOU WERE DEAD OR WISHED YOU COULD GO TO SLEEP AND NOT WAKE UP?: NO
6. HAVE YOU EVER DONE ANYTHING, STARTED TO DO ANYTHING, OR PREPARED TO DO ANYTHING TO END YOUR LIFE?: NO
TOTAL  NUMBER OF ABORTED OR SELF INTERRUPTED ATTEMPTS SINCE LAST CONTACT: NO
SUICIDE, SINCE LAST CONTACT: NO
2. HAVE YOU ACTUALLY HAD ANY THOUGHTS OF KILLING YOURSELF?: NO
TOTAL  NUMBER OF INTERRUPTED ATTEMPTS SINCE LAST CONTACT: NO

## 2024-04-17 ASSESSMENT — ACTIVITIES OF DAILY LIVING (ADL)
ADLS_ACUITY_SCORE: 35

## 2024-04-17 NOTE — ED NOTES
Discharge instructions reviewed with patient including follow-up care plan. Educated on medication regime and advised not to stop prescribed medication without consulting their physician. Seroquel 50mg ER sent home with the patient. Reviewed safety plan and outpatient resources/appointments.Verbalized understanding of discharge instructions. Denies suicidal ideation.

## 2024-04-17 NOTE — ED NOTES
All belongings which where brought into the hospital have been returned to patient. Discharged to home with family friend.

## 2024-04-17 NOTE — ED NOTES
Patient up and sitting at the table reading after breakfast this am.  He would like to talk to the LMHP regarding discharge plans.  He avoids eye contact and is guarded in responses.

## 2024-04-17 NOTE — ED NOTES
Pt. has been stable in mood tonight. In and out of sensory room. Appropriate interactions with peers and staff. Enjoys being on [facility] but hopes to transition home tomorrow. Talks about how much he loved his job and co-workers. Very regretful about his behavior which lead to termination. Hopes that he will be able to find employment in future. Denies any suicidal ideation. Medication compliant and VSS.

## 2024-04-17 NOTE — PROGRESS NOTES
Triage & Transition Services, Extended Care     Client Name: Cachorro King    Date: April 17, 2024      Service Type: Patient declined attending group today due to awaiting discharge .  Session Start Time: 12:55 pm    Session End Time: 01:15 pm  Session Length:  20 minutes  Site Location: [facility] EMERGENCY DEPT                             [id number]            ANNI Viera   Licensed Mental Health Professional (LMHP), Extended Care  [phone]

## 2024-04-17 NOTE — PROGRESS NOTES
"Triage and Transition Services Extended Care Reassessment     Patient: Cachorro goes by \"Cachorro,\" uses he/him pronouns  Date of Service: April 17, 2024  Site of Service: [facility] EMERGENCY DEPT                             [id number]  Patient was seen yes  Mode of Assessment: In person     Reason for Reassessment:  (discharge)    History of Patient's Original Emergency Room Encounter: schizoaffective disorder, rishabh with erratic and bizarre behaviors    Current Patient Presentation: calm, cooperative, engaged and future focused    Presentation Summary: Patient has been spending his time in the milieu of [facility] reading a book, interacting with peers, or riding the exercise bike.  He has been calm, cooperative, polite, appropriate, and future oriented. He willing meets with writer to explore what discharging today would look like including needing to get clearance from parents/guardians.  Patient reports his mental health is \"doing great\" today and he is able to recall events leading up to ED presentation almost one week ago.  In that recollection of events, patient is displaying insight into being manic with delusions while acknowledging the need to communicate changes better to his established providers.  Patient is going to reconnect with therapist he has seen before and has an appointment soon with psychiatrist at [facility]. Patient denies SI, HI, AH, VH, paranoia and delusions today.  He completed Safety Plan with writer.  Patient have conversations with mother, roommate, and roommate's mother yesterday and informed them all he could be discharging today.    Changes Observed Since Initial Assessment: decrease in presenting symptoms, patient/family request    Therapeutic Interventions Provided: Engaged in safety planning, Engaged in guided discovery, explored patient's perspectives and helped expand them through socratic dialogue., Taught the link between thoughts, feelings, and behaviors., " Reviewed healthy living that supports positive mental health, including looking at sleep hygiene, regular movement, nutrition, and regular socialization., Provided positive reinforcement for progress towards goals, gains in knowledge, and application of skills previously taught., Engaged in social skills training., Identified and practiced coping skills., Discussed and practiced mindfulness.    Current Symptoms: anxious (in the context of needing to get parents/guardians final approve to discharge home today) impaired decision making anxious  (delusions)      Mental Status Exam   Affect: Appropriate  Appearance: Appropriate  Attention Span/Concentration: Attentive  Eye Contact: Engaged    Fund of Knowledge: Appropriate   Language /Speech Content: Fluent  Language /Speech Volume: Normal  Language /Speech Rate/Productions: Normal  Recent Memory: Intact  Remote Memory: Intact  Mood: Normal  Orientation to Person: Yes   Orientation to Place: Yes  Orientation to Time of Day: Yes  Orientation to Date: Yes     Situation (Do they understand why they are here?): Yes  Psychomotor Behavior: Normal  Thought Content: Clear  Thought Form: Intact    Treatment Objective(s) Addressed: rapport building, orienting the patient to therapy, identifying and practicing coping strategies, processing feelings, identifying an appropriate aftercare plan, assessing safety, identifying additional supports, exploring obstacles to safety in the community    Patient Response to Interventions: acceptance expressed, verbalizes understanding, eager to participate    Progress Towards Goals:  Patient Reports Symptoms Are: improving  Patient Progress Toward Goals: is making progress  Comment: Patient is no longer displaying delusional thinking, paranoia, or rishabh.  He has insight into recent rishabh episode.  Next Step to Work Toward Discharge: collaboration with OP team/family/friends, follow up on referrals  Symptom Stabilization Comment: Pt will be  removed from IP worklist due to continued stabilization. Plan for Pt to transfer to [facility] for continued stabilization and possible discharge coordination.  Collaboration Comment: Patient's parents/guardians did give approval for discharge knowing that medications would be sent home with patient, records would be sent to outpatient psychiatrist, and roommate's mother would pick him up to transport home.  Symptom Stabilization Comment: schedule new appointment with previous therapist and attend established appointment with psychiatry provider    Case Management: Case Management Included: collaborating with patient's support system  Details on Collaborating with Patient's Support System: spoke with patient's parents/guardians  Summary of Interaction: Parents/Guardians are on board with discharge home today given patient is no longer displaying rishabh or delusional thought content.  Writer assured patients we are filling medications here and will be sent home with patient.    C-SSRS Since Last Contact:   1. Wish to be Dead (Since Last Contact): No  2. Non-Specific Active Suicidal Thoughts (Since Last Contact): No     Actual Attempt (Since Last Contact): No  Has subject engaged in non-suicidal self-injurious behavior? (Since Last Contact): No  Interrupted Attempts (Since Last Contact): No  Aborted or Self-Interrupted Attempt (Since Last Contact): No  Preparatory Acts or Behavior (Since Last Contact): No  Suicide (Since Last Contact): No     Calculated C-SSRS Risk Score (Since Last Contact): No Risk Indicated    Plan: Final Disposition / Recommended Care Path: discharge  Plan for Care reviewed with assigned Medical Provider: yes  Plan for Care Team Review: provider, RN  Comments: Andish  Patient and/or validated legal guardian concurs: yes  Clinical Substantiation: Patient has been engaged with staff and peers, visible in the milieu reading his book or riding the exercise bike.  He has been calm, cooperative, polite,  appropriate, and future oriented.  Patient is asking about discharge today as he feels back to baseline.  Patient is displaying insight that he had a manic episode with delusional thoughts and thought broadcasting at time of presentation and now with medication adjustments he feels better.  Patient is future oriented with wanting to continue with established psychiatry provider, reschedule with previous therapist, and get connected with [facility] case management.   He has spoken with his parents/guardians, roommate and roommate's mother in the past 24 hours and had good conversations with each of them.  Patient is not experiencing SI, HI, AH, VH, paranoia and delusions.  He will be discharged home after getting approval from parents/guardians with plan of roommate's mother transporting.    Legal Status: Legal Status at Admission: Guardian/ad litum  72 Hour Hold - Date/Time Initiated: na  72 Hour Hold - Date/Time Ends: na    Session Status: Time session started: 1005  Time session ended: 1025  Session Duration (minutes): 20 minutes  Session Number: 6  Anticipated number of sessions or this episode of care: 6    Session Start Time: 1005  Session Stop Time: 1025  CPT codes: 60318 - Psychotherapy (with patient) - 30 (16-37*) min  Time Spent: 20 minutes      CPT code(s) utilized: 40058 - Psychotherapy (with patient) - 30 (16-37*) min    Diagnosis:   Patient Active Problem List   Diagnosis    Schizoaffective disorder, bipolar type (H)       Primary Problem This Admission: Active Hospital Problems    Schizoaffective disorder, bipolar type (H)        Schizoaffective disorder, bipolar type (H)  F25.0     Carroll Villanueva, YASMEEN, [facility] HospitalSW  Licensed Mental Health Professional (LMHP)  [place], [phone]

## 2024-04-17 NOTE — DISCHARGE INSTRUCTIONS
Aftercare Plan  If I am feeling unsafe or I am in a crisis, I will:   Contact my established care providers   Call the National Suicide Prevention Lifeline: 988  Go to the nearest emergency room   Call 911     Schedule with Teressa, your therapist, again with regular ongoing appointments    Continue working with Ayse psychiatrist at [facility].  She will be getting a copy of your medication adjustments made here.     Take medications as prescribed and communicate with providers when you notices any deviance from baseline symptoms.     [facility] referral has been submitted on your behalf requesting case management services.  They will contact you when that referral is processed.     Additional Information  Today you were seen by a licensed mental health professional through Triage and Transition services, [facility] ([facility])  for a crisis assessment in the Emergency Department at [facility].  It is recommended that you follow up with your established providers (psychiatrist, mental health therapist, and/or primary care doctor - as relevant) as soon as possible. Coordinators from [facility] will be calling you in the next 24-48 hours to ensure that you have the resources you need.  You can also contact [facility] coordinators directly at [phone]. You may have been scheduled for or offered an appointment with a mental health provider. [facility] maintains an extensive network of licensed behavioral health providers to connect patients with the services they need.  We do not charge providers a fee to participate in our referral network.  We match patients with providers based on a patient's specific needs, insurance coverage, and location.  Our first effort will be to refer you to a provider within your care system, and will utilize providers outside your care system as needed.

## 2024-04-17 NOTE — PROGRESS NOTES
Pt slept most of this shift; got up around 0200 to grab some snack and went back to sensory room after finishing it. Pt able to go back to sleep. No distress noted at this time.